# Patient Record
Sex: FEMALE | Race: WHITE | URBAN - METROPOLITAN AREA
[De-identification: names, ages, dates, MRNs, and addresses within clinical notes are randomized per-mention and may not be internally consistent; named-entity substitution may affect disease eponyms.]

---

## 2017-11-21 ENCOUNTER — APPOINTMENT (OUTPATIENT)
Dept: GENERAL RADIOLOGY | Age: 62
End: 2017-11-21
Payer: MEDICARE

## 2017-11-21 ENCOUNTER — HOSPITAL ENCOUNTER (EMERGENCY)
Age: 62
Discharge: NURSING, REHAB OR LONG-TERM CARE | End: 2017-11-21
Attending: STUDENT IN AN ORGANIZED HEALTH CARE EDUCATION/TRAINING PROGRAM
Payer: MEDICARE

## 2017-11-21 VITALS
SYSTOLIC BLOOD PRESSURE: 115 MMHG | DIASTOLIC BLOOD PRESSURE: 92 MMHG | HEART RATE: 78 BPM | RESPIRATION RATE: 16 BRPM | TEMPERATURE: 97.9 F | OXYGEN SATURATION: 95 %

## 2017-11-21 DIAGNOSIS — M25.441 SWELLING OF FINGER JOINT, RIGHT: Primary | ICD-10-CM

## 2017-11-21 PROCEDURE — 99285 EMERGENCY DEPT VISIT HI MDM: CPT

## 2017-11-21 RX ORDER — HYDRALAZINE HYDROCHLORIDE 10 MG/1
10 TABLET, FILM COATED ORAL 4 TIMES DAILY
COMMUNITY

## 2017-11-21 RX ORDER — HALOPERIDOL 5 MG
5 TABLET ORAL DAILY
COMMUNITY

## 2017-11-21 RX ORDER — DIVALPROEX SODIUM 250 MG/1
250 TABLET, DELAYED RELEASE ORAL 3 TIMES DAILY
COMMUNITY

## 2017-11-21 RX ORDER — LEVOTHYROXINE SODIUM 0.1 MG/1
100 TABLET ORAL DAILY
COMMUNITY

## 2017-11-21 RX ORDER — QUETIAPINE FUMARATE 100 MG/1
100 TABLET, FILM COATED ORAL DAILY
COMMUNITY

## 2017-11-21 RX ORDER — LACTULOSE 10 G/15ML
20 SOLUTION ORAL 2 TIMES DAILY
COMMUNITY

## 2017-11-21 RX ORDER — ANASTROZOLE 1 MG/1
1 TABLET ORAL DAILY
COMMUNITY

## 2017-11-21 RX ORDER — CLONAZEPAM 0.5 MG/1
0.5 TABLET ORAL DAILY
COMMUNITY

## 2017-11-21 RX ORDER — HYDROCHLOROTHIAZIDE 25 MG/1
25 TABLET ORAL DAILY
COMMUNITY

## 2017-11-21 RX ORDER — LOSARTAN POTASSIUM 100 MG/1
100 TABLET ORAL DAILY
COMMUNITY

## 2017-11-21 RX ORDER — GABAPENTIN 100 MG/1
200 CAPSULE ORAL 3 TIMES DAILY
COMMUNITY

## 2017-11-21 RX ORDER — HYDROCHLOROTHIAZIDE 12.5 MG/1
12.5 CAPSULE, GELATIN COATED ORAL DAILY
COMMUNITY

## 2017-11-21 RX ORDER — ACETAMINOPHEN 325 MG/1
TABLET ORAL EVERY 6 HOURS PRN
COMMUNITY

## 2017-11-21 RX ORDER — MAGNESIUM HYDROXIDE/ALUMINUM HYDROXICE/SIMETHICONE 120; 1200; 1200 MG/30ML; MG/30ML; MG/30ML
5 SUSPENSION ORAL EVERY 6 HOURS PRN
COMMUNITY

## 2017-11-21 RX ORDER — LITHIUM CARBONATE 300 MG/1
300 CAPSULE ORAL 2 TIMES DAILY WITH MEALS
COMMUNITY

## 2017-11-21 RX ORDER — MELOXICAM 7.5 MG/1
7.5 TABLET ORAL DAILY
COMMUNITY

## 2017-11-21 RX ORDER — PALIPERIDONE 6 MG/1
6 TABLET, EXTENDED RELEASE ORAL 2 TIMES DAILY
COMMUNITY

## 2017-11-21 RX ORDER — LANOLIN ALCOHOL/MO/W.PET/CERES
3 CREAM (GRAM) TOPICAL NIGHTLY PRN
COMMUNITY

## 2017-11-21 RX ORDER — TRAZODONE HYDROCHLORIDE 50 MG/1
50 TABLET ORAL NIGHTLY
COMMUNITY

## 2017-11-21 ASSESSMENT — PAIN SCALES - GENERAL
PAINLEVEL_OUTOF10: 8
PAINLEVEL_OUTOF10: 0

## 2017-11-21 ASSESSMENT — ENCOUNTER SYMPTOMS
SHORTNESS OF BREATH: 0
COUGH: 0
NAUSEA: 0
VOMITING: 0
DIARRHEA: 0
ABDOMINAL PAIN: 0

## 2017-11-21 ASSESSMENT — PAIN DESCRIPTION - LOCATION: LOCATION: HAND

## 2017-11-21 ASSESSMENT — PAIN DESCRIPTION - ORIENTATION: ORIENTATION: RIGHT

## 2017-11-21 ASSESSMENT — PAIN DESCRIPTION - PAIN TYPE: TYPE: ACUTE PAIN

## 2017-11-21 NOTE — ED NOTES
CHRISTINE Lino Clamp continues to try and cut off ring from Pt's hand.      Lexii Marcos, RN  11/21/17 8812

## 2017-11-21 NOTE — ED NOTES
2 rings removed. CHRISTINE Bunn attempting to cut through final ring on right hand.      Nick Dixon RN  11/21/17 9101

## 2017-11-21 NOTE — ED NOTES
Pt continues to refuse care while in Er.   CHRISTINE Watts at bedside and Pt continues to refuse any treatment at this [facility]     Laverne Uribe RN  11/21/17 8396

## 2017-11-21 NOTE — ED NOTES
[facility] at Room 12 for transfer back to [facility]. Pt now refusing to return to facility and now wants the rings cut off and taken care of.  CHRISTINE Muse in with Pt with ring cutter. Pt allowing CHRISTINE Muse to remove rings.        Sherin Christie RN  11/21/17 5521

## 2017-11-21 NOTE — ED NOTES
Rn, ED Tech and Security at bedside with Pt. Pt using ER computer and filled a pillow case with ER supplies from cabinet.   ED Tech to remain in room with Pt at this time     Lisette Carter RN  11/21/17 6667

## 2017-11-21 NOTE — ED NOTES
Rings not removed at this time. CHRISTINE Morales continues to work on cutting rings off of Pt.      Florencia Coelho RN  11/21/17 4336

## 2017-11-21 NOTE — ED TRIAGE NOTES
Pt from [facility] via EMS for 3 rings to right hand, 4th digit. Pt's finger swollen and rings unable to be removed. Pt refusing to allow staff to cut rings off.   Pt also refusing to answer questions r/t name and birthdate

## 2017-11-21 NOTE — ED PROVIDER NOTES
[address] [facility] ED  eMERGENCY dEPARTMENT eNCOUnter      Pt Name: Kayla Luo  MRN: [id number]  Armsjustynagfamry kay 1955  Date of evaluation: 11/21/2017  Provider: Anu Mari [id number]       Chief Complaint   Patient presents with    Hand Injury     3 rings stuck on right hand, 4th digit, sent for sever swelling and rings unable to be removed         HISTORY OF PRESENT ILLNESS   (Location/Symptom, Timing/Onset, Context/Setting, Quality, Duration, Modifying Factors, Severity)  Note limiting factors. Kayla Luo is a 65 y.o. female who presents to the emergency department After being sent by [facility] psychiatric facility for ring removal.  Patient has 3 rings on her right fourth digit with a swollen MIP joint. HPI    Nursing Notes were reviewed. REVIEW OF SYSTEMS    (2-9 systems for level 4, 10 or more for level 5)     Review of Systems   Constitutional: Negative for chills, diaphoresis, fatigue and fever. HENT: Negative for congestion. Respiratory: Negative for cough and shortness of breath. Cardiovascular: Negative for chest pain and palpitations. Gastrointestinal: Negative for abdominal pain, diarrhea, nausea and vomiting. Genitourinary: Negative for dysuria and flank pain. Musculoskeletal:        Swollen finger with 3 rings on   Skin: Negative for rash. Neurological: Negative for dizziness, light-headedness and headaches. Except as noted above the remainder of the review of systems was reviewed and negative. PAST MEDICAL HISTORY   No past medical history on file. SURGICAL HISTORY     No past surgical history on file.       CURRENT MEDICATIONS       Previous Medications    ACETAMINOPHEN (TYLENOL) 325 MG TABLET    Take by mouth every 6 hours as needed for Pain    ALUMINUM & MAGNESIUM HYDROXIDE-SIMETHICONE (MAALOX) 200-200-20 MG/5ML SUSP SUSPENSION    Take 5 mLs by mouth every 6 hours as needed for Indigestion    ANASTROZOLE (ARIMIDEX) 1 MG TABLET    Take 1 mg by mouth daily    CLONAZEPAM (KLONOPIN) 0.5 MG TABLET    Take 0.5 mg by mouth daily . DIVALPROEX (DEPAKOTE) 250 MG DR TABLET    Take 250 mg by mouth 3 times daily    GABAPENTIN (NEURONTIN) 100 MG CAPSULE    Take 200 mg by mouth 3 times daily    HALOPERIDOL (HALDOL) 5 MG TABLET    Take 5 mg by mouth daily    HYDRALAZINE (APRESOLINE) 10 MG TABLET    Take 10 mg by mouth 4 times daily    HYDROCHLOROTHIAZIDE (HYDRODIURIL) 25 MG TABLET    Take 25 mg by mouth daily    HYDROCHLOROTHIAZIDE (MICROZIDE) 12.5 MG CAPSULE    Take 12.5 mg by mouth daily    LACTULOSE (CHRONULAC) 10 GM/15ML SOLUTION    Take 20 g by mouth 2 times daily    LEVOTHYROXINE (SYNTHROID) 100 MCG TABLET    Take 100 mcg by mouth Daily    LITHIUM 300 MG CAPSULE    Take 300 mg by mouth 2 times daily (with meals)    LOSARTAN (COZAAR) 100 MG TABLET    Take 100 mg by mouth daily    MELATONIN 3 MG TABS TABLET    Take 3 mg by mouth nightly as needed    MELOXICAM (MOBIC) 7.5 MG TABLET    Take 7.5 mg by mouth daily    PALIPERIDONE (INVEGA) 6 MG EXTENDED RELEASE TABLET    Take 6 mg by mouth 2 times daily    QUETIAPINE (SEROQUEL) 100 MG TABLET    Take 100 mg by mouth daily    TRAZODONE (DESYREL) 50 MG TABLET    Take 50 mg by mouth nightly       ALLERGIES     Macrolides and ketolides; Penicillins; and Quinolones    FAMILY HISTORY     No family history on file.        SOCIAL HISTORY       Social History     Social History    Marital status: Single     Spouse name: N/A    Number of children: N/A    Years of education: N/A     Social History Main Topics    Smoking status: Not on file    Smokeless tobacco: Not on file    Alcohol use Not on file    Drug use: Unknown    Sexual activity: Not on file     Other Topics Concern    Not on file     Social History Narrative    No narrative on file       SCREENINGS             PHYSICAL EXAM    (up to 7 for level 4, 8 or more for level 5)     ED Triage Vitals [11/21/17 1033]   BP Temp Temp Source Pulse Resp SpO2 Height Weight   (!) 115/92 97.9 °F (36.6 °C) Oral 78 16 95 % -- --       Physical Exam   Constitutional: She is oriented to person, place, and time. She appears well-developed and well-nourished. She is active. No distress. HENT:   Head: Normocephalic and atraumatic. Mouth/Throat: Mucous membranes are normal.   Neck: Normal range of motion. Neck supple. Cardiovascular: Normal rate, regular rhythm, normal heart sounds, intact distal pulses and normal pulses. Pulmonary/Chest: Effort normal and breath sounds normal.   Abdominal: Soft. Normal appearance and bowel sounds are normal. There is no tenderness. Musculoskeletal:        Hands:  Good cap refill to affected finger. Neurological: She is alert and oriented to person, place, and time. She has normal strength. Skin: Skin is warm, dry and intact. No rash noted. She is not diaphoretic. Psychiatric: Her speech is rapid and/or pressured. She is agitated. Nursing note and vitals reviewed. DIAGNOSTIC RESULTS     EKG: All EKG's are interpreted by the Emergency Department Physician who either signs or Co-signs this chart in the absence of a cardiologist.        RADIOLOGY:   Non-plain film images such as CT, Ultrasound and MRI are read by the radiologist. Plain radiographic images are visualized and preliminarily interpreted by the emergency physician with the below findings:    Refused by the patient    Interpretation per the Radiologist below, if available at the time of this note:    XR HAND RIGHT (MIN 3 VIEWS)    (Results Pending)         ED BEDSIDE ULTRASOUND:   Performed by ED Physician - none    LABS:  Labs Reviewed - No data to display    All other labs were within normal range or not returned as of this dictation.     EMERGENCY DEPARTMENT COURSE and DIFFERENTIAL DIAGNOSIS/MDM:   Vitals:    Vitals:    11/21/17 1033   BP: (!) 115/92   Pulse: 78   Resp: 16   Temp: 97.9 °F (36.6 °C)   TempSrc: Oral   SpO2: 95%         ED Course as of [address]   [address] Nov 21, 2017   4470 I spoke with Dr. Brad Peters regarding the status of the patient. As the patient is refusing ring removal and refusing to be evaluated he understands that we have limited options in the emergency department. The nurse that called previously asked if we could sedate the patient to remove the rings, I explained to her it is not appropriate to sedate her patient to perform a procedure that she does not wish to have. I informed Dr. Saul Soto of this and if the patient needs to be sent back to the emergency department for ring removal when she consents that will be performed. [DH]      ED Course User Index  [DH] Fernanda Bright, CNP     MDM Patient was seen and evaluated by the Attending Physician as well as myself. the patient is alert and oriented ×3. She is refusing her rings to be removed. She is refusing x-ray. She is refusing all care. I spoke with the patient at length regarding risks associated with not removing the ring or at least obtaining an x-ray. She repeatedly states multiple times that she does not want her rings removed or an x-ray to be obtained. She states \"you are not taking these rings off of my finger, I don't care you're not touching me. \" She states that she has no desire to have her rings removed at this time but will consider it at a future date if it is necessary. She'll be discharged back to [facility]. Standard anticipatory guidance given to patient upon discharge. Have given them a specific time frame in which to follow-up and who to follow-up with. I have also advised them that they should return to the emergency department if they get worse, or not getting better or develop any new or concerning symptoms. Patient demonstrates understanding. Just prior to EMS departure the patient was finally agreeable to having rings were removed. These were removed, placed in a patient bag and sent back with the patient to the nursing home. She did continue to refuse x-ray.  CRITICAL CARE TIME       CONSULTS:  None    PROCEDURES:  Unless otherwise noted below, none     Procedures    FINAL IMPRESSION      1.  Swelling of finger joint, right          DISPOSITION/PLAN   DISPOSITION AMA    PATIENT REFERRED TO:  [place]            DISCHARGE MEDICATIONS:  New Prescriptions    No medications on file          (Please note that portions of this note were completed with a voice recognition program.  Efforts were made to edit the dictations but occasionally words are mis-transcribed.)    Abdulkadir Stewart CNP (electronically signed)  Attending Emergency Physician       Abdulkadir Stewart, [facility]  11/21/17 8251